# Patient Record
Sex: FEMALE | Race: WHITE | NOT HISPANIC OR LATINO | Employment: FULL TIME | URBAN - NONMETROPOLITAN AREA
[De-identification: names, ages, dates, MRNs, and addresses within clinical notes are randomized per-mention and may not be internally consistent; named-entity substitution may affect disease eponyms.]

---

## 2020-12-29 ENCOUNTER — IMMUNIZATION (OUTPATIENT)
Dept: VACCINE CLINIC | Facility: HOSPITAL | Age: 30
End: 2020-12-29

## 2020-12-29 PROCEDURE — 0011A: CPT | Performed by: INTERNAL MEDICINE

## 2020-12-29 PROCEDURE — 91301 HC SARSCO02 VAC 100MCG/0.5ML IM: CPT | Performed by: INTERNAL MEDICINE

## 2021-01-26 ENCOUNTER — IMMUNIZATION (OUTPATIENT)
Dept: VACCINE CLINIC | Facility: HOSPITAL | Age: 31
End: 2021-01-26

## 2021-01-26 PROCEDURE — 91301 HC SARSCO02 VAC 100MCG/0.5ML IM: CPT | Performed by: INTERNAL MEDICINE

## 2021-01-26 PROCEDURE — 0012A: CPT | Performed by: INTERNAL MEDICINE

## 2023-12-04 ENCOUNTER — OFFICE VISIT (OUTPATIENT)
Dept: FAMILY MEDICINE CLINIC | Facility: CLINIC | Age: 33
End: 2023-12-04
Payer: COMMERCIAL

## 2023-12-04 VITALS
WEIGHT: 160.4 LBS | SYSTOLIC BLOOD PRESSURE: 122 MMHG | DIASTOLIC BLOOD PRESSURE: 78 MMHG | OXYGEN SATURATION: 99 % | HEART RATE: 84 BPM | HEIGHT: 64 IN | BODY MASS INDEX: 27.39 KG/M2 | TEMPERATURE: 98.3 F

## 2023-12-04 DIAGNOSIS — R19.00 PELVIC FULLNESS IN FEMALE: Primary | ICD-10-CM

## 2023-12-04 DIAGNOSIS — R10.2 PELVIC PAIN: ICD-10-CM

## 2023-12-04 DIAGNOSIS — Z80.49 FAMILY HISTORY OF UTERINE CANCER: ICD-10-CM

## 2023-12-04 DIAGNOSIS — K59.00 CONSTIPATION, UNSPECIFIED CONSTIPATION TYPE: ICD-10-CM

## 2023-12-04 DIAGNOSIS — N89.8 VAGINAL IRRITATION: ICD-10-CM

## 2023-12-04 LAB
BILIRUB BLD-MCNC: NEGATIVE MG/DL
CLARITY, POC: CLEAR
COLOR UR: NORMAL
EXPIRATION DATE: NORMAL
GLUCOSE UR STRIP-MCNC: NEGATIVE MG/DL
KETONES UR QL: NEGATIVE
LEUKOCYTE EST, POC: NEGATIVE
Lab: NORMAL
NITRITE UR-MCNC: NEGATIVE MG/ML
PH UR: 6 [PH] (ref 5–8)
PROT UR STRIP-MCNC: NEGATIVE MG/DL
RBC # UR STRIP: NEGATIVE /UL
SP GR UR: 1.03 (ref 1–1.03)
UROBILINOGEN UR QL: NORMAL

## 2023-12-04 PROCEDURE — 81003 URINALYSIS AUTO W/O SCOPE: CPT | Performed by: NURSE PRACTITIONER

## 2023-12-04 PROCEDURE — 99204 OFFICE O/P NEW MOD 45 MIN: CPT | Performed by: NURSE PRACTITIONER

## 2023-12-04 NOTE — PROGRESS NOTES
New Patient History and Physical      Referring Physician: No ref. 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 She went to  and was given anbx but urine culture was negative,   Then started having left and right side/back pain   Still has pelvic fullness, some vaginal burning inside, no odor, discharge, some vaginal irritation  She is not currently sexually active, none in several years      Periods for 4-5 days with normal flow, light cramping, occur monthly   Has a BM every couple days, some straining with BM, with bristol 1-2 stool   No change in diet recently   She reports her paps have always been abnormal, had colpo in , was told to keep having yearly paps, repeat colpo in  with leep in . She has hx of hpv, follows with SELAM Sims in [facility], leep was done by Carina García.       Review of Systems   Gen- No fevers, chills  CV- No chest pain, palpitations  Resp- No cough, dyspnea  Neuro-No dizziness, headaches    Past Medical History:   Past Medical History:   Diagnosis Date    Dysplasia of cervix        Past Surgical History:  Past Surgical History:   Procedure Laterality Date     SECTION         Family History: family history includes Cancer in her paternal grandmother; Colon cancer in her paternal grandfather and paternal grandmother; Diabetes in her father and maternal grandfather; Hearing loss in her paternal grandfather; Heart disease in her maternal grandfather; Lung cancer in her paternal grandfather; No Known Problems in her mother; Uterine cancer in her paternal grandmother.    Social History:  reports that she has never smoked. She has never used smokeless tobacco. She reports current  "alcohol use. Drug use questions deferred to the physician.    Medications:  No current outpatient medications on file.    Allergies:  Allergies   Allergen Reactions    Amoxicillin Itching       Objective     Vital Signs:   Vitals:    12/04/23 1044   BP: 122/78   Pulse: 84   Temp: 98.3 °F (36.8 °C)   SpO2: 99%   Weight: 72.8 kg (160 lb 6.4 oz)   Height: 162.6 cm (64\")     Body mass index is 27.53 kg/m².    Physical Exam:    Physical Exam  Vitals and nursing note reviewed.   Constitutional:       Appearance: She is well-developed.   HENT:      Head: Normocephalic and atraumatic.   Eyes:      Pupils: Pupils are equal, round, and reactive to light.   Cardiovascular:      Rate and Rhythm: Normal rate and regular rhythm.      Heart sounds: Normal heart sounds.   Pulmonary:      Effort: Pulmonary effort is normal.      Breath sounds: Normal breath sounds.   Abdominal:      General: Bowel sounds are normal. There is no distension.      Palpations: Abdomen is soft.      Tenderness: There is no abdominal tenderness.   Musculoskeletal:      Cervical back: Neck supple.   Skin:     General: Skin is warm and dry.   Neurological:      General: No focal deficit present.      Mental Status: She is alert and oriented to person, place, and time.   Psychiatric:         Mood and Affect: Mood normal.         Behavior: Behavior normal.         Assessment / Plan     Assessment/Plan:   Problem List Items Addressed This Visit    None  Visit Diagnoses       Pelvic fullness in female    -  Primary    Relevant Orders    US Non-ob Transvaginal    Comprehensive Metabolic Panel    CBC Auto Differential    POCT urinalysis dipstick, automated (Completed)    Constipation, unspecified constipation type        Vaginal irritation        Relevant Orders    NuSwab VG+ - Swab, Vagina    Family history of uterine cancer        Relevant Orders    US Non-ob Transvaginal    Pelvic pain        Relevant Orders    US Non-ob Transvaginal    Comprehensive Metabolic " Panel    CBC Auto Differential          -- miralax OTC discussed, fiber discussed  -- follow nuswab  -- due to complaints and FH I have ordered TVUS, will follow  -- UA unremarkable     Discussed plan of care in detail with pt today; pt verb understanding and agrees.    I have reviewed pertinent health maintenance applicable to this patient.    Follow up:  Yearly or sooner pending workup     Electronically signed by SELAM Barone   12/04/2023 10:59 EST      Please note that portions of this note were completed with a voice recognition program.

## 2023-12-06 LAB
A VAGINAE DNA VAG QL NAA+PROBE: NORMAL SCORE
BVAB2 DNA VAG QL NAA+PROBE: NORMAL SCORE
C ALBICANS DNA VAG QL NAA+PROBE: NEGATIVE
C GLABRATA DNA VAG QL NAA+PROBE: NEGATIVE
C TRACH DNA VAG QL NAA+PROBE: NEGATIVE
MEGA1 DNA VAG QL NAA+PROBE: NORMAL SCORE
N GONORRHOEA DNA VAG QL NAA+PROBE: NEGATIVE
T VAGINALIS DNA VAG QL NAA+PROBE: NEGATIVE

## 2023-12-08 ENCOUNTER — PATIENT ROUNDING (BHMG ONLY) (OUTPATIENT)
Dept: FAMILY MEDICINE CLINIC | Facility: CLINIC | Age: 33
End: 2023-12-08
Payer: COMMERCIAL

## 2023-12-26 ENCOUNTER — HOSPITAL ENCOUNTER (OUTPATIENT)
Dept: ULTRASOUND IMAGING | Facility: HOSPITAL | Age: 33
Discharge: HOME | End: 2023-12-26
Admitting: NURSE PRACTITIONER
Payer: COMMERCIAL

## 2023-12-26 DIAGNOSIS — R10.2 PELVIC PAIN: ICD-10-CM

## 2023-12-26 DIAGNOSIS — Z80.49 FAMILY HISTORY OF UTERINE CANCER: ICD-10-CM

## 2023-12-26 DIAGNOSIS — R19.00 PELVIC FULLNESS IN FEMALE: ICD-10-CM

## 2023-12-26 PROCEDURE — 76830 TRANSVAGINAL US NON-OB: CPT

## 2023-12-28 NOTE — PROGRESS NOTES
US shows a cyst on the ovarian, ok to monitor however if symptoms continue to occur then I want you to check in with GYN